# Patient Record
Sex: MALE | Employment: STUDENT | URBAN - METROPOLITAN AREA
[De-identification: names, ages, dates, MRNs, and addresses within clinical notes are randomized per-mention and may not be internally consistent; named-entity substitution may affect disease eponyms.]

---

## 2018-12-05 ENCOUNTER — OFFICE VISIT (OUTPATIENT)
Dept: FAMILY MEDICINE | Facility: CLINIC | Age: 28
End: 2018-12-05
Payer: COMMERCIAL

## 2018-12-05 ENCOUNTER — RADIANT APPOINTMENT (OUTPATIENT)
Dept: GENERAL RADIOLOGY | Facility: CLINIC | Age: 28
End: 2018-12-05
Attending: FAMILY MEDICINE
Payer: COMMERCIAL

## 2018-12-05 VITALS
OXYGEN SATURATION: 96 % | HEIGHT: 69 IN | TEMPERATURE: 98.4 F | DIASTOLIC BLOOD PRESSURE: 71 MMHG | RESPIRATION RATE: 20 BRPM | HEART RATE: 82 BPM | WEIGHT: 174.6 LBS | SYSTOLIC BLOOD PRESSURE: 105 MMHG | BODY MASS INDEX: 25.86 KG/M2

## 2018-12-05 DIAGNOSIS — Z00.00 ROUTINE GENERAL MEDICAL EXAMINATION AT A HEALTH CARE FACILITY: ICD-10-CM

## 2018-12-05 DIAGNOSIS — G47.00 INSOMNIA, UNSPECIFIED TYPE: ICD-10-CM

## 2018-12-05 DIAGNOSIS — Z00.00 ROUTINE GENERAL MEDICAL EXAMINATION AT A HEALTH CARE FACILITY: Primary | ICD-10-CM

## 2018-12-05 LAB
% GRANULOCYTES: 52.2 %G (ref 40–75)
ALBUMIN SERPL-MCNC: 4.8 MG/DL (ref 3.8–5)
ALP SERPL-CCNC: 72.8 U/L (ref 31.7–110.7)
ALT SERPL-CCNC: 17.2 U/L (ref 0–45)
AST SERPL-CCNC: 20.6 U/L (ref 0–55)
BILIRUB SERPL-MCNC: 0.5 MG/DL (ref 0.2–1.3)
BILIRUBIN DIRECT: 0.2 MG/DL (ref 0.1–0.3)
BILIRUBIN UR: ABNORMAL
BLOOD UR: NEGATIVE
CHOLEST SERPL-MCNC: 197.1 MG/DL (ref 0–200)
CHOLEST/HDLC SERPL: 5.4 {RATIO} (ref 0–5)
GLUCOSE SERPL-MCNC: 70 MG'DL (ref 70–99)
GLUCOSE URINE: NEGATIVE
GRANULOCYTES #: 3.1 K/UL (ref 1.6–8.3)
HCT VFR BLD AUTO: 49.2 % (ref 40–53)
HDLC SERPL-MCNC: 36.2 MG/DL
HEMOGLOBIN: 15.9 G/DL (ref 13.3–17.7)
KETONES UR QL: ABNORMAL
LDLC SERPL CALC-MCNC: 136 MG/DL (ref 0–129)
LEUKOCYTE ESTERASE UR: NEGATIVE
LYMPHOCYTES # BLD AUTO: 2.3 K/UL (ref 0.8–5.3)
LYMPHOCYTES NFR BLD AUTO: 38.6 %L (ref 20–48)
MCH RBC QN AUTO: 30.8 PG (ref 26.5–35)
MCHC RBC AUTO-ENTMCNC: 32.3 G/DL (ref 32–36)
MCV RBC AUTO: 95.4 FL (ref 78–100)
MID #: 0.6 K/UL (ref 0–2.2)
MID %: 9.2 %M (ref 0–20)
NITRITE UR QL STRIP: NEGATIVE
PH UR STRIP: 6 [PH] (ref 5–7)
PLATELET # BLD AUTO: 255 K/UL (ref 150–450)
PROT SERPL-MCNC: 7.3 G/DL (ref 6.8–8.8)
PROTEIN UR: ABNORMAL
RBC # BLD AUTO: 5.16 M/UL (ref 4.4–5.9)
SP GR UR STRIP: >=1.03
TRIGL SERPL-MCNC: 125.2 MG/DL (ref 0–150)
UROBILINOGEN UR STRIP-ACNC: ABNORMAL
VLDL CHOLESTEROL: 25 MG/DL (ref 7–32)
WBC # BLD AUTO: 6 K/UL (ref 4–11)

## 2018-12-05 RX ORDER — LANOLIN ALCOHOL/MO/W.PET/CERES
3 CREAM (GRAM) TOPICAL
Qty: 30 TABLET | Refills: 0 | Status: SHIPPED | OUTPATIENT
Start: 2018-12-05

## 2018-12-05 NOTE — PROGRESS NOTES
Preceptor Attestation:   Patient seen, evaluated and discussed with the resident. I have verified the content of the note, which accurately reflects my assessment of the patient and the plan of care.   Supervising Physician:  Lima Noble MD

## 2018-12-05 NOTE — NURSING NOTE
Mantoux/PPD screening questions    1. Have you had recent contact with a person with active Tuberculosis (TB)? NO  2. Have you had this type of test before? No  3. Have you had a live vaccine (Smallpox, Flumist, MMR, Varicella, Oral Polio, or Yellow Fever) in the past 4 weeks? NO  4. Have you ever received the Bacillus Calmette-Ciera (BCG) vaccine for Tuberculosis? NO  5. Have you ever been treated for Tuberculosis before? NO    Patient is eligible for a PPD test.  I placed the PPD on the left forearm.  I advised patient to avoid scratching the area and to return to the clinic in 48-72 hours for interpretation. Aide was onsite at the time of placement.    Jessica Ramírez CMA

## 2018-12-05 NOTE — PROGRESS NOTES
Male Physical Note          HPI         Concerns today: No special concerns today.      There is no problem list on file for this patient.      History reviewed. No pertinent past medical history.    Previous Medical Care     Minimal care in past     History reviewed. No pertinent family history.           Review of Systems:     Review of Systems:  CONSTITUTIONAL: NEGATIVE for fever, chills, change in weight  INTEGUMENTARY/SKIN: NEGATIVE for worrisome rashes, moles or lesions  EYES: NEGATIVE for vision changes or irritation  ENT/MOUTH: NEGATIVE for ear, mouth and throat problems  RESP: NEGATIVE for significant cough or SOB  BREAST: NEGATIVE for masses, tenderness or discharge  CV: NEGATIVE for chest pain, palpitations or peripheral edema  GI: NEGATIVE for nausea, abdominal pain, heartburn, or change in bowel habits  : NEGATIVE for frequency, dysuria, or hematuria  MUSCULOSKELETAL: NEGATIVE for significant arthralgias or myalgia  NEURO: NEGATIVE for weakness, dizziness or paresthesias  ENDOCRINE: NEGATIVE for temperature intolerance, skin/hair changes  HEME/ALLERGY: NEGATIVE for bleeding problems  PSYCHIATRIC: NEGATIVE for changes in mood or affect  Sleep:   Do you snore most or the night (as reported by a family member)? No  Do you feel sleepy or extremely tired during most of the day? No             Social History     Social History     Social History     Marital status:      Spouse name: N/A     Number of children: N/A     Years of education: N/A     Occupational History     Not on file.     Social History Main Topics     Smoking status: Never Smoker     Smokeless tobacco: Never Used     Alcohol use No     Drug use: Not on file     Sexual activity: Not on file     Other Topics Concern     Not on file     Social History Narrative       Marital Status:  Who lives in your household? Has 5 year old son, and wife is expecting    Has anyone hurt you physically, for example by pushing, hitting,  "slapping or kicking you or forcing you to have sex? Denies  Do you feel threatened or controlled by a partner, ex-partner or anyone in your life? Denies    Sexual Health     Sexual concerns: No   STI History: Neg      Recommended Screening       None             Physical Exam:     Vitals: /71  Pulse 82  Temp 98.4  F (36.9  C) (Oral)  Resp 20  Ht 5' 9.45\" (176.4 cm)  Wt 174 lb 9.6 oz (79.2 kg)  SpO2 96%  BMI 25.45 kg/m2  BMI= Body mass index is 25.45 kg/(m^2).  GENERAL: healthy, alert and no distress  EYES: Eyes grossly normal to inspection, extraocular movements - intact, and PERRL  NECK: no tenderness, no adenopathy, no asymmetry, no masses, no stiffness; thyroid- normal to palpation  RESP: lungs clear to auscultation - no rales, no rhonchi, no wheezes  CV: regular rates and rhythm, normal S1 S2, no S3 or S4 and no murmur, no click or rub -  ABDOMEN: soft, no tenderness, no  hepatosplenomegaly, no masses, normal bowel sounds  MS: extremities- no gross deformities noted, no edema  SKIN: no suspicious lesions, no rashes  NEURO: strength and tone- normal, sensory exam- grossly normal, mentation- intact, speech- normal, reflexes- symmetric  BACK: no CVA tenderness, no paralumbar tenderness  PSYCH: Alert and oriented times 3; speech- coherent , normal rate and volume; able to articulate logical thoughts, able to abstract reason, no tangential thoughts, no hallucinations or delusions, affect- normal  LYMPHATICS: ant. cervical- normal, post. cervical- normal, axillary- normal, supraclavicular- normal, inguinal- normal    Assessment and Plan      Doreen was seen today for physical.    Diagnoses and all orders for this visit:    Routine general medical examination at a health care facility        1. Health Care Maintenance: Normal Physical Exam    PLAN:    Patient brought in a physical form which was written predominantly in Turkish and difficult to read.  Patient reports that he needed a form for his upcoming job " and for school.  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  Patient was unclear what the form was for.  Patient unclear on what his job is but reported that he needed a form to continue in his program.    1.CBC, urinalysis, LFTs, fasting blood sugar  2. Chest Xray  3. Mantoux skin test      Options for treatment and follow-up care were reviewed with the patient. Doreen Dyer and/or guardian engaged in the decision making process and verbalized understanding of the options discussed and agreed with the final plan.    Wili Saenz, DO

## 2018-12-06 LAB
HBV SURFACE AB SERPL IA-ACNC: 1.18 M[IU]/ML
HBV SURFACE AG SERPL QL IA: NONREACTIVE
HCV AB SERPL QL IA: NONREACTIVE
HIV 1+2 AB+HIV1 P24 AG SERPL QL IA: NONREACTIVE

## 2018-12-07 ENCOUNTER — NURSE TRIAGE (OUTPATIENT)
Dept: NURSING | Facility: CLINIC | Age: 28
End: 2018-12-07

## 2018-12-07 NOTE — TELEPHONE ENCOUNTER
Additional Information    Negative: [1] Caller is not with the adult (patient) AND [2] reporting urgent symptoms    Negative: Lab result questions    Negative: Medication questions    Negative: Caller cannot be reached by phone    Negative: Caller has already spoken to PCP or another triager    Negative: RN needs further essential information from caller in order to complete triage    Negative: Requesting regular office appointment    Negative: [1] Caller requesting NON-URGENT health information AND [2] PCP's office is the best resource    Negative: Health Information question, no triage required and triager able to answer question    General information question, no triage required and triager able to answer question    Protocols used: INFORMATION ONLY CALL-ADULT-ERIC Logan calls and says that he had a TB test on Wednesday, at 4 pm, and needs this read by tomorrow, at 4 PM. Pt. Wants to know if [facility] clinics are open tomorrow. RN then answered pt's question and pt. Voiced understanding.

## 2018-12-07 NOTE — TELEPHONE ENCOUNTER
Pt calling to see if the [facility] clinics read mantoux tests.  Writer explained that if pt present to  for this he most likely pt will be billed for an UC visit.  eBrkley referred him to his insurance company as he has questions about his copays.  Pt verbalized understanding and had no further questions.     Melissa Terry RN/[facility]     Additional Information    Negative: [1] Caller is not with the adult (patient) AND [2] reporting urgent symptoms    Negative: Lab result questions    Negative: Medication questions    Negative: Caller cannot be reached by phone    Negative: Caller has already spoken to PCP or another triager    Negative: RN needs further essential information from caller in order to complete triage    Negative: Requesting regular office appointment    Negative: [1] Caller requesting NON-URGENT health information AND [2] PCP's office is the best resource    Health Information question, no triage required and triager able to answer question    Protocols used: [facility]

## 2018-12-08 ENCOUNTER — OFFICE VISIT (OUTPATIENT)
Dept: URGENT CARE | Facility: URGENT CARE | Age: 28
End: 2018-12-08
Payer: COMMERCIAL

## 2018-12-08 VITALS
OXYGEN SATURATION: 97 % | HEIGHT: 69 IN | WEIGHT: 174 LBS | DIASTOLIC BLOOD PRESSURE: 70 MMHG | SYSTOLIC BLOOD PRESSURE: 108 MMHG | TEMPERATURE: 97.8 F | BODY MASS INDEX: 25.77 KG/M2 | HEART RATE: 73 BPM

## 2018-12-08 DIAGNOSIS — Z11.1 ENCOUNTER FOR PPD SKIN TEST READING: Primary | ICD-10-CM

## 2018-12-08 PROCEDURE — 99201 ZZC OFFICE/OUTPT VISIT, NEW, LEVEL I: CPT | Performed by: PHYSICIAN ASSISTANT

## 2018-12-08 NOTE — MR AVS SNAPSHOT
"              After Visit Summary   2018    Doreen Dyer    MRN: [id number]           Patient Information     Date Of Birth          1990        Visit Information        Provider Department      2018 10:30 AM Trever Greco PA-C [facility]        Today's Diagnoses     Encounter for PPD skin test reading    -  1       Follow-ups after your visit        Who to contact     If you have questions or need follow up information about today's clinic visit or your schedule please contact [facility] directly at [phone].  Normal or non-critical lab and imaging results will be communicated to you by [employer], letter or phone within 4 business days after the clinic has received the results. If you do not hear from us within 7 days, please contact the clinic through [employer] or phone. If you have a critical or abnormal lab result, we will notify you by phone as soon as possible.  Submit refill requests through [employer] or call your pharmacy and they will forward the refill request to us. Please allow 3 business days for your refill to be completed.          Additional Information About Your Visit        MyChart Information     [employer] lets you send messages to your doctor, view your test results, renew your prescriptions, schedule appointments and more. To sign up, go to [email or web address] . Click on \"Log in\" on the left side of the screen, which will take you to the Welcome page. Then click on \"Sign up Now\" on the right side of the page.     You will be asked to enter the access code listed below, as well as some personal information. Please follow the directions to create your username and password.     Your access code is: [email or web address]  Expires: 3/8/2019 10:52 AM     Your access code will  in 90 days. If you need help or a new code, please call your [place] clinic or [phone].        Care EveryWhere ID     This is your Care " "EveryWhere ID. This could be used by other organizations to access your [place] medical records  [id number]        Your Vitals Were     Pulse Temperature Height Pulse Oximetry BMI (Body Mass Index)       73 97.8  F (36.6  C) (Tympanic) 5' 9.45\" (1.764 m) 97% 25.36 kg/m2        Blood Pressure from Last 3 Encounters:   12/08/18 108/70   12/05/18 105/71   04/13/14 122/81    Weight from Last 3 Encounters:   12/08/18 174 lb (78.9 kg)   12/05/18 174 lb 9.6 oz (79.2 kg)   04/13/14 200 lb (90.7 kg)              Today, you had the following     No orders found for display       Primary Care Provider Fax #    Physician No Ref-Primary [phone]       No address on file        Equal Access to Services     CLARENCE MCCARTY : Hadii becky Haque, waana thomas, vu medinaalnathan loera, toshia horn . So [facility] [phone].    ATENCIÓN: Si habla español, tiene a rizvi disposición servicios gratuitos de asistencia lingüística. Marciano al [phone].    We comply with applicable federal civil rights laws and [place] laws. We do not discriminate on the basis of race, color, national origin, age, disability, sex, sexual orientation, or gender identity.            Thank you!     Thank you for choosing [facility]  for your care. Our goal is always to provide you with excellent care. Hearing back from our patients is one way we can continue to improve our services. Please take a few minutes to complete the written survey that you may receive in the mail after your visit with us. Thank you!             Your Updated Medication List - Protect others around you: Learn how to safely use, store and throw away your medicines at www.disposemymeds.org.          This list is accurate as of 12/8/18 10:52 AM.  Always use your most recent med list.                   Brand Name Dispense Instructions for use Diagnosis    melatonin 3 MG tablet     30 tablet    Take 1 tablet (3 mg) by " mouth nightly as needed for sleep    Insomnia, unspecified type

## 2018-12-08 NOTE — PROGRESS NOTES
"SUBJECTIVE:  Doreen Dyer is a 27 year old male who presents to the clinic today for TB reading. Given ppd 4 pm on Wednesday. Needs read today. No history of HIV, immunosuppression, or BCG vaccine.       No past medical history on file.  Current Outpatient Prescriptions   Medication Sig Dispense Refill     melatonin 3 MG tablet Take 1 tablet (3 mg) by mouth nightly as needed for sleep (Patient not taking: Reported on 12/8/2018) 30 tablet 0     Social History   Substance Use Topics     Smoking status: Never Smoker     Smokeless tobacco: Never Used     Alcohol use No       ROS:  CONSTITUTIONAL:NEGATIVE for fever, chills, change in weight  INTEGUMENTARY/SKIN: NEGATIVE for worrisome rashes, moles or lesions    EXAM:   /70  Pulse 73  Temp 97.8  F (36.6  C) (Tympanic)  Ht 5' 9.45\" (1.764 m)  Wt 174 lb (78.9 kg)  SpO2 97%  BMI 25.36 kg/m2  GENERAL: alert, no acute distress.  SKIN: Rash description:    Distribution: left forearm 0 mm reading.   GENERAL APPEARANCE: healthy, alert and no distress  RESP: lungs clear to auscultation - no rales, rhonchi or wheezes    ASSESSMENT:    1. Encounter for PPD skin test reading   negative    PLAN:   1) See today's orders.  2) Follow-up with primary clinic for other lab results.   "

## 2018-12-11 ENCOUNTER — TRANSFERRED RECORDS (OUTPATIENT)
Dept: HEALTH INFORMATION MANAGEMENT | Facility: CLINIC | Age: 28
End: 2018-12-11

## 2018-12-11 ENCOUNTER — OFFICE VISIT (OUTPATIENT)
Dept: FAMILY MEDICINE | Facility: CLINIC | Age: 28
End: 2018-12-11
Payer: COMMERCIAL

## 2018-12-11 VITALS
SYSTOLIC BLOOD PRESSURE: 104 MMHG | DIASTOLIC BLOOD PRESSURE: 63 MMHG | BODY MASS INDEX: 25.28 KG/M2 | WEIGHT: 173.4 LBS | RESPIRATION RATE: 16 BRPM | HEART RATE: 72 BPM

## 2018-12-11 DIAGNOSIS — Z02.89 ENCOUNTER FOR COMPLETION OF FORM WITH PATIENT: ICD-10-CM

## 2018-12-11 DIAGNOSIS — R82.2 BILIRUBINURIA: Primary | ICD-10-CM

## 2018-12-11 DIAGNOSIS — E78.5 DYSLIPIDEMIA (HIGH LDL; LOW HDL): ICD-10-CM

## 2018-12-11 NOTE — PROGRESS NOTES
HPI       Doreen Dyer is a 27 year old  who presents for   Chief Complaint   Patient presents with     Forms     Needs forms filled out for traveling to [facility]  Seen by Dr. Saenz 12/05/2018 for physical  Reviewed Results with patient today - bilirubinuria noted on urine, ketones, slightly elevated LDL and low HDL - currently on ketogenic diet may help explain ketonuria and LDL findings      Problem, Medication and Allergy Lists were reviewed and updated if needed..    Patient is an established patient of this clinic..         Review of Systems:   Review of Systems  No concerns       Physical Exam:     Vitals:    12/11/18 1112   BP: 104/63   Pulse: 72   Resp: 16   Weight: 78.7 kg (173 lb 6.4 oz)     Body mass index is 25.28 kg/m .  Vitals were reviewed and were normal     Physical Exam   Constitutional: He appears well-developed and well-nourished.   HENT:   Head: Normocephalic and atraumatic.   Mouth/Throat: Oropharynx is clear and moist.   Eyes: Conjunctivae and EOM are normal. Pupils are equal, round, and reactive to light.   Neck: Normal range of motion.   Cardiovascular: Normal rate.   Pulmonary/Chest: Effort normal.   Musculoskeletal: Normal range of motion.   Neurological: He is alert.   Skin: Skin is warm and dry.   Psychiatric: He has a normal mood and affect. His behavior is normal. Judgment and thought content normal.         Results:   No testing ordered today   Vision: R eye 20/40; L eye 20/30 - does wear glasses and utilized for exam  Color discrimination - passed  Hearing L/R - passed   reviewed - no prescriptions for medications noted    Assessment and Plan        Doreen was seen today for forms.    Diagnoses and all orders for this visit:    Bilirubinuria  Comments:  recheck urine in 3-6 months. LFTs normal, CBC normal. Unclear underlying cause. consider renal ultrasound if persistent. Related to ketogenic diet?    Dyslipidemia (high LDL; low HDL)    Encounter for  "completion of form with patient  Comments:  Form in Latvian completed with patient providing translation. R column checked to indicate \"passed\", \"negative finding\" or \"free from disease\". If this is incorrect translation as provided by patient, please note that the check marks are to reflect what was stated above.      Addended for adjusting of visit diagnoses     There are no discontinued medications.    Options for treatment and follow-up care were reviewed with the patient. Doreen Dyer  engaged in the decision making process and verbalized understanding of the options discussed and agreed with the final plan.    Geo Lange MD  "

## 2018-12-11 NOTE — LETTER
December 11, 2018      Doreen Franklinimanxavi Cb Barrera  [address]  [place] [address]      Dear Mr.Al Barrera,    We are writing to inform you of your test results.    Results for orders placed or performed in visit on 12/05/18   XR CHEST 2 VW    Narrative    EXAM:  XR CHEST 2 VW    INDICATION: ; Routine general medical examination at a health care  facility    COMPARISON:  None    FINDINGS:  PA and lateral views of the chest. Cardiomediastinal silhouette is  within normal limits. No focal airspace abnormalities. Upper abdomen  is unremarkable. No acute bony lesions.      Impression    IMPRESSION:  No acute cardiopulmonary findings.    I have personally reviewed the examination and initial interpretation  and I agree with the findings.    ALEJANDRO ROACH, DO     Component      Latest Ref Rng & Units 12/5/2018   WBC      4.0 - 11.0 K/uL 6.0   Lymphocytes #      0.8 - 5.3 K/uL 2.3   % Lymphocytes      20.0 - 48.0 %L 38.6   Mid #      0.0 - 2.2 K/uL 0.6   Mid %      0.0 - 20.0 %M 9.2   GRANULOCYTES #      1.6 - 8.3 K/uL 3.1   % Granulocytes      40.0 - 75.0 %G 52.2   RBC      4.40 - 5.90 M/uL 5.16   Hemoglobin      13.3 - 17.7 g/dL 15.9   Hematocrit      40.0 - 53.0 % 49.2   MCV      78.0 - 100.0 fL 95.4   MCH      26.5 - 35.0 pg 30.8   MCHC      32.0 - 36.0 g/dL 32.3   Platelets      150.0 - 450.0 K/uL 255.0   Specific Gravity Urine      1.005 - 1.030 >=1.030   pH Urine      4.5 - 8.0 6.0   Leukocyte Esterase UR      NEGATIVE Negative   Nitrite Urine      NEGATIVE Negative   Protein UR      NEGATIVE Trace (A)   Glucose Urine      NEGATIVE Negative   Ketones Urine      NEGATIVE 4+ (A)   Urobilinogen mg/dL      0.2 E.U./dL 0.2 E.U./dL   Bilirubin UR      NEGATIVE 2+ (A)   Blood UR      NEGATIVE Negative   Albumin      3.8 - 5.0 mg/dL 4.8   Alkaline Phosphatase      31.7 - 110.7 U/L 72.8   ALT      0.0 - 45.0 U/L 17.2   AST      0.0 - 55.0 U/L 20.6   Bilirubin Direct      0.1 - 0.3 mg/dL 0.2    Bilirubin Total      0.2 - 1.3 mg/dL 0.5   Protein Total      6.8 - 8.8 g/dL 7.3   Cholesterol      0.0 - 200.0 mg/dL 197.1   Cholesterol/HDL Ratio      0.0 - 5.0 5.4 (H)   HDL Cholesterol      >40.0 mg/dL 36.2 (L)   LDL Cholesterol Calculated      0 - 129 mg/dL 136 (H)   Triglycerides      0.0 - 150.0 mg/dL 125.2   VLDL Cholesterol      7.0 - 32.0 mg/dL 25.0   Glucose      70.0 - 99.0 mg'dL 70.0   HIV Antigen Antibody Combo      NR:Nonreactive     Nonreactive   Hepatitis C Antibody      NR:Nonreactive Nonreactive   Hep B Surface Agn      NR:Nonreactive Nonreactive   Hepatitis B Surface Antibody      <8.00 m[IU]/mL 1.18       If you have any questions or concerns, please call the clinic at the number listed above.       Sincerely,      eGo Lange MD

## 2018-12-11 NOTE — LETTER
December 11, 2018    Doreen Dyer  [address];  [place]; [place] [address]      Dear Doreen,    Please see below for your test results.    Results for orders placed or performed in visit on 12/05/18   XR CHEST 2 VW    Narrative    EXAM:  XR CHEST 2 VW    INDICATION: ; Routine general medical examination at a health care facility  COMPARISON:  None  FINDINGS:  PA and lateral views of the chest. Cardiomediastinal silhouette is within normal limits. No focal airspace abnormalities. Upper abdomen is unremarkable. No acute bony lesions.      Impression    IMPRESSION:  No acute cardiopulmonary findings.    I have personally reviewed the examination and initial interpretation and I agree with the findings.    ALEJANDRO ROACH, DO     Resulted Orders   Urinalysis, Micro If (UA) ([facility])   Result Value Ref Range     Specific Gravity Urine >=1.030 1.005 - 1.030     pH Urine 6.0 4.5 - 8.0     Leukocyte Esterase UR Negative NEGATIVE     Nitrite Urine Negative NEGATIVE     Protein UR Trace (A) NEGATIVE     Glucose Urine Negative NEGATIVE     Ketones Urine 4+ (A) NEGATIVE     Urobilinogen mg/dL 0.2 E.U./dL 0.2 E.U./dL     Bilirubin UR 2+ (A) NEGATIVE     Blood UR Negative NEGATIVE   CBC with Diff Plt ([facility])   Result Value Ref Range     WBC 6.0 4.0 - 11.0 K/uL     Lymphocytes # 2.3 0.8 - 5.3 K/uL     % Lymphocytes 38.6 20.0 - 48.0 %L     Mid # 0.6 0.0 - 2.2 K/uL     Mid % 9.2 0.0 - 20.0 %M     GRANULOCYTES # 3.1 1.6 - 8.3 K/uL     % Granulocytes 52.2 40.0 - 75.0 %G     RBC 5.16 4.40 - 5.90 M/uL     Hemoglobin 15.9 13.3 - 17.7 g/dL     Hematocrit 49.2 40.0 - 53.0 %     MCV 95.4 78.0 - 100.0 fL     MCH 30.8 26.5 - 35.0 pg     MCHC 32.3 32.0 - 36.0 g/dL     Platelets 255.0 150.0 - 450.0 K/uL   Hepatic Panel ([facility])   Result Value Ref Range     Albumin 4.8 3.8 - 5.0 mg/dL     Alkaline Phosphatase 72.8 31.7 - 110.7 U/L     ALT 17.2 0.0 - 45.0 U/L     AST 20.6 0.0 - 55.0 U/L     Bilirubin Direct 0.2  0.1 - 0.3 mg/dL     Bilirubin Total 0.5 0.2 - 1.3 mg/dL     Protein Total 7.3 6.8 - 8.8 g/dL   Glucose ([facility])   Result Value Ref Range     Glucose 70.0 70.0 - 99.0 mg'dL   HIV Antigen Antibody Combo   Result Value Ref Range     HIV Antigen Antibody Combo Nonreactive NR^Nonreactive             Comment:         HIV-1 p24 Ag & HIV-1/HIV-2 Ab Not Detected   Hepatitis C antibody   Result Value Ref Range     Hepatitis C Antibody Nonreactive NR^Nonreactive         Comment:         Assay performance characteristics have not been established for newborns,   infants, and children      Hepatitis B surface antigen   Result Value Ref Range     Hep B Surface Agn Nonreactive NR^Nonreactive   Hepatitis B Surface Antibody   Result Value Ref Range     Hepatitis B Surface Antibody 1.18 <8.00 m[IU]/mL         Comment:         Nonreactive, No antibody detected when the value is less than 8.00 m[IU]/mL.   Lipid Cascade ([facility])   Result Value Ref Range     Cholesterol 197.1 0.0 - 200.0 mg/dL     Cholesterol/HDL Ratio 5.4 (H) 0.0 - 5.0     HDL Cholesterol 36.2 (L) >40.0 mg/dL     LDL Cholesterol Calculated 136 (H) 0 - 129 mg/dL     Triglycerides 125.2 0.0 - 150.0 mg/dL     VLDL Cholesterol 25.0 7.0 - 32.0 mg/dL     12/8/2018 TB Reading - negative by Trever Greco PA-C      If you have any questions, please call the clinic to make an appointment.    Sincerely,    Geo Lange MD

## 2020-03-01 ENCOUNTER — HEALTH MAINTENANCE LETTER (OUTPATIENT)
Age: 30
End: 2020-03-01

## 2020-12-14 ENCOUNTER — HEALTH MAINTENANCE LETTER (OUTPATIENT)
Age: 30
End: 2020-12-14

## 2021-04-17 ENCOUNTER — HEALTH MAINTENANCE LETTER (OUTPATIENT)
Age: 31
End: 2021-04-17

## 2021-10-02 ENCOUNTER — HEALTH MAINTENANCE LETTER (OUTPATIENT)
Age: 31
End: 2021-10-02

## 2022-05-08 ENCOUNTER — HEALTH MAINTENANCE LETTER (OUTPATIENT)
Age: 32
End: 2022-05-08

## 2023-01-14 ENCOUNTER — HEALTH MAINTENANCE LETTER (OUTPATIENT)
Age: 33
End: 2023-01-14

## 2023-06-02 ENCOUNTER — HEALTH MAINTENANCE LETTER (OUTPATIENT)
Age: 33
End: 2023-06-02